# Patient Record
Sex: MALE | Race: WHITE | HISPANIC OR LATINO | Employment: UNEMPLOYED | URBAN - METROPOLITAN AREA
[De-identification: names, ages, dates, MRNs, and addresses within clinical notes are randomized per-mention and may not be internally consistent; named-entity substitution may affect disease eponyms.]

---

## 2020-06-01 ENCOUNTER — APPOINTMENT (OUTPATIENT)
Dept: RADIOLOGY | Facility: MEDICAL CENTER | Age: 75
End: 2020-06-01
Attending: EMERGENCY MEDICINE

## 2020-06-01 ENCOUNTER — HOSPITAL ENCOUNTER (EMERGENCY)
Facility: MEDICAL CENTER | Age: 75
End: 2020-06-01
Attending: EMERGENCY MEDICINE

## 2020-06-01 VITALS
HEIGHT: 66 IN | DIASTOLIC BLOOD PRESSURE: 74 MMHG | OXYGEN SATURATION: 96 % | SYSTOLIC BLOOD PRESSURE: 124 MMHG | TEMPERATURE: 98.1 F | BODY MASS INDEX: 25.16 KG/M2 | HEART RATE: 63 BPM | RESPIRATION RATE: 17 BRPM | WEIGHT: 156.53 LBS
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
## 2020-06-01 DIAGNOSIS — S09.90XA CLOSED HEAD INJURY, INITIAL ENCOUNTER: ICD-10-CM

## 2020-06-01 LAB
ALBUMIN SERPL BCP-MCNC: 4.1 G/DL (ref 3.2–4.9)
ALBUMIN/GLOB SERPL: 1.6 G/DL
ALP SERPL-CCNC: 102 U/L (ref 30–99)
ALT SERPL-CCNC: 20 U/L (ref 2–50)
ANION GAP SERPL CALC-SCNC: 13 MMOL/L (ref 7–16)
AST SERPL-CCNC: 22 U/L (ref 12–45)
BASOPHILS # BLD AUTO: 0.4 % (ref 0–1.8)
BASOPHILS # BLD: 0.03 K/UL (ref 0–0.12)
BILIRUB SERPL-MCNC: 0.9 MG/DL (ref 0.1–1.5)
BUN SERPL-MCNC: 17 MG/DL (ref 8–22)
CALCIUM SERPL-MCNC: 9 MG/DL (ref 8.5–10.5)
CHLORIDE SERPL-SCNC: 101 MMOL/L (ref 96–112)
CO2 SERPL-SCNC: 24 MMOL/L (ref 20–33)
CREAT SERPL-MCNC: 0.77 MG/DL (ref 0.5–1.4)
EKG IMPRESSION: NORMAL
EOSINOPHIL # BLD AUTO: 0.01 K/UL (ref 0–0.51)
EOSINOPHIL NFR BLD: 0.1 % (ref 0–6.9)
ERYTHROCYTE [DISTWIDTH] IN BLOOD BY AUTOMATED COUNT: 43.6 FL (ref 35.9–50)
GLOBULIN SER CALC-MCNC: 2.6 G/DL (ref 1.9–3.5)
GLUCOSE SERPL-MCNC: 125 MG/DL (ref 65–99)
HCT VFR BLD AUTO: 47.6 % (ref 42–52)
HGB BLD-MCNC: 16.3 G/DL (ref 14–18)
IMM GRANULOCYTES # BLD AUTO: 0.03 K/UL (ref 0–0.11)
IMM GRANULOCYTES NFR BLD AUTO: 0.4 % (ref 0–0.9)
LYMPHOCYTES # BLD AUTO: 0.8 K/UL (ref 1–4.8)
LYMPHOCYTES NFR BLD: 9.5 % (ref 22–41)
MCH RBC QN AUTO: 31.7 PG (ref 27–33)
MCHC RBC AUTO-ENTMCNC: 34.2 G/DL (ref 33.7–35.3)
MCV RBC AUTO: 92.4 FL (ref 81.4–97.8)
MONOCYTES # BLD AUTO: 0.46 K/UL (ref 0–0.85)
MONOCYTES NFR BLD AUTO: 5.5 % (ref 0–13.4)
NEUTROPHILS # BLD AUTO: 7.1 K/UL (ref 1.82–7.42)
NEUTROPHILS NFR BLD: 84.1 % (ref 44–72)
NRBC # BLD AUTO: 0 K/UL
NRBC BLD-RTO: 0 /100 WBC
PLATELET # BLD AUTO: 193 K/UL (ref 164–446)
PMV BLD AUTO: 11 FL (ref 9–12.9)
POTASSIUM SERPL-SCNC: 3.8 MMOL/L (ref 3.6–5.5)
PROT SERPL-MCNC: 6.7 G/DL (ref 6–8.2)
RBC # BLD AUTO: 5.15 M/UL (ref 4.7–6.1)
SODIUM SERPL-SCNC: 138 MMOL/L (ref 135–145)
TROPONIN T SERPL-MCNC: 11 NG/L (ref 6–19)
WBC # BLD AUTO: 8.4 K/UL (ref 4.8–10.8)

## 2020-06-01 PROCEDURE — 93005 ELECTROCARDIOGRAM TRACING: CPT

## 2020-06-01 PROCEDURE — 99285 EMERGENCY DEPT VISIT HI MDM: CPT

## 2020-06-01 PROCEDURE — 84484 ASSAY OF TROPONIN QUANT: CPT

## 2020-06-01 PROCEDURE — 80053 COMPREHEN METABOLIC PANEL: CPT

## 2020-06-01 PROCEDURE — 85025 COMPLETE CBC W/AUTO DIFF WBC: CPT

## 2020-06-01 PROCEDURE — 71045 X-RAY EXAM CHEST 1 VIEW: CPT

## 2020-06-01 PROCEDURE — 36415 COLL VENOUS BLD VENIPUNCTURE: CPT

## 2020-06-01 PROCEDURE — 93005 ELECTROCARDIOGRAM TRACING: CPT | Performed by: EMERGENCY MEDICINE

## 2020-06-01 PROCEDURE — 70450 CT HEAD/BRAIN W/O DYE: CPT

## 2020-06-01 SDOH — HEALTH STABILITY: MENTAL HEALTH: HOW OFTEN DO YOU HAVE A DRINK CONTAINING ALCOHOL?: NEVER

## 2020-06-02 NOTE — ED NOTES
Pt resting in bed. Call light in reach. Side rails up. Family at bedside. Blood drawn and sent to lab.

## 2020-06-02 NOTE — ED TRIAGE NOTES
Chief Complaint   Patient presents with   • GLF     unwitnessed, denies hitting his head, hx cva     Pt family found pt on the floor at home.  Pt hgas no recall of the event.  EKG ordered in triage

## 2020-06-02 NOTE — ED NOTES
Patient and family verbalize understanding of follow up, care, pain management and when to return to the ED.  Patient discharged with family.  All questions answered.   used.

## 2020-06-02 NOTE — ED PROVIDER NOTES
ED Provider Note    CHIEF COMPLAINT  Chief Complaint   Patient presents with   • GLF     unwitnessed, denies hitting his head, hx cva       HPI  Frederick Bui is a 74 y.o. male who presents for evaluation of possible fall.  Patient is accompanied by his daughter.  He is visiting from [place].  There is a report of a possible fall.  Patient denies this.  No report of pain or deformity to the upper or lower extremities.  No numbness weakness or tingling.  He apparently has had a prior stroke.  No other symptoms reported    REVIEW OF SYSTEMS  See HPI for further details.  No fever productive cough chest pain all other systems are negative.     PAST MEDICAL HISTORY  Past Medical History:   Diagnosis Date   • Hypertension    • Stroke (HCC)        FAMILY HISTORY  Noncontributory    SOCIAL HISTORY  Social History     Socioeconomic History   • Marital status: Not on file     Spouse name: Not on file   • Number of children: Not on file   • Years of education: Not on file   • Highest education level: Not on file   Occupational History   • Not on file   Social Needs   • Financial resource strain: Not on file   • Food insecurity     Worry: Not on file     Inability: Not on file   • Transportation needs     Medical: Not on file     Non-medical: Not on file   Tobacco Use   • Smoking status: Never Smoker   • Smokeless tobacco: Never Used   Substance and Sexual Activity   • Alcohol use: Never     Frequency: Never   • Drug use: Never   • Sexual activity: Not on file   Lifestyle   • Physical activity     Days per week: Not on file     Minutes per session: Not on file   • Stress: Not on file   Relationships   • Social connections     Talks on phone: Not on file     Gets together: Not on file     Attends Gnosticism service: Not on file     Active member of club or organization: Not on file     Attends meetings of clubs or organizations: Not on file     Relationship status: Not on file   • Intimate partner violence     Fear of  "current or ex partner: Not on file     Emotionally abused: Not on file     Physically abused: Not on file     Forced sexual activity: Not on file   Other Topics Concern   • Not on file   Social History Narrative   • Not on file     Visiting from [place]  SURGICAL HISTORY  No past surgical history on file.  Major surgeries  CURRENT MEDICATIONS  No current facility-administered medications for this encounter.   No current outpatient medications on file.      ALLERGIES  No Known Allergies    PHYSICAL EXAM  VITAL SIGNS: /80   Pulse 69   Temp 36.6 °C (97.9 °F) (Temporal)   Resp 14   Ht 1.676 m (5' 6\")   Wt 71 kg (156 lb 8.4 oz)   SpO2 98%   BMI 25.26 kg/m²       Constitutional: Well developed, Well nourished, No acute distress, Non-toxic appearance.   HENT: Normocephalic, Atraumatic, Bilateral external ears normal, Oropharynx moist, No oral exudates, Nose normal.   Eyes: PERRLA, EOMI, Conjunctiva normal, No discharge.   Neck: Normal range of motion, No tenderness, Supple, No stridor.   Cardiovascular: Normal heart rate, Normal rhythm, No murmurs, No rubs, No gallops.   Thorax & Lungs: Normal breath sounds, No respiratory distress, No wheezing, No chest tenderness.   Abdomen: Bowel sounds normal, Soft, No tenderness, No masses, No pulsatile masses.   Skin: Warm, Dry, No erythema, No rash.   Back: No tenderness, No CVA tenderness.   Extremities: Intact distal pulses, No edema, No tenderness, No cyanosis, No clubbing.   Musculoskeletal: Good range of motion in all major joints. No tenderness to palpation or major deformities noted.   Neurologic: Alert & oriented x 3, Normal motor function, Normal sensory function, No focal deficits noted.   Psychiatric: Affect normal, Judgment normal, Mood normal.     DX-CHEST-PORTABLE (1 VIEW)   Final Result      Bibasilar opacity likely from atelectasis      CT-HEAD W/O   Final Result         1. No acute intracranial hemorrhage.      2. Low-attenuation area in the left frontal " lobe could relate to encephalomalacia. Associated ex-vacuo dilatation of the left frontal horn.              Results for orders placed or performed during the hospital encounter of 06/01/20   CBC WITH DIFFERENTIAL   Result Value Ref Range    WBC 8.4 4.8 - 10.8 K/uL    RBC 5.15 4.70 - 6.10 M/uL    Hemoglobin 16.3 14.0 - 18.0 g/dL    Hematocrit 47.6 42.0 - 52.0 %    MCV 92.4 81.4 - 97.8 fL    MCH 31.7 27.0 - 33.0 pg    MCHC 34.2 33.7 - 35.3 g/dL    RDW 43.6 35.9 - 50.0 fL    Platelet Count 193 164 - 446 K/uL    MPV 11.0 9.0 - 12.9 fL    Neutrophils-Polys 84.10 (H) 44.00 - 72.00 %    Lymphocytes 9.50 (L) 22.00 - 41.00 %    Monocytes 5.50 0.00 - 13.40 %    Eosinophils 0.10 0.00 - 6.90 %    Basophils 0.40 0.00 - 1.80 %    Immature Granulocytes 0.40 0.00 - 0.90 %    Nucleated RBC 0.00 /100 WBC    Neutrophils (Absolute) 7.10 1.82 - 7.42 K/uL    Lymphs (Absolute) 0.80 (L) 1.00 - 4.80 K/uL    Monos (Absolute) 0.46 0.00 - 0.85 K/uL    Eos (Absolute) 0.01 0.00 - 0.51 K/uL    Baso (Absolute) 0.03 0.00 - 0.12 K/uL    Immature Granulocytes (abs) 0.03 0.00 - 0.11 K/uL    NRBC (Absolute) 0.00 K/uL   Comp Metabolic Panel   Result Value Ref Range    Sodium 138 135 - 145 mmol/L    Potassium 3.8 3.6 - 5.5 mmol/L    Chloride 101 96 - 112 mmol/L    Co2 24 20 - 33 mmol/L    Anion Gap 13.0 7.0 - 16.0    Glucose 125 (H) 65 - 99 mg/dL    Bun 17 8 - 22 mg/dL    Creatinine 0.77 0.50 - 1.40 mg/dL    Calcium 9.0 8.5 - 10.5 mg/dL    AST(SGOT) 22 12 - 45 U/L    ALT(SGPT) 20 2 - 50 U/L    Alkaline Phosphatase 102 (H) 30 - 99 U/L    Total Bilirubin 0.9 0.1 - 1.5 mg/dL    Albumin 4.1 3.2 - 4.9 g/dL    Total Protein 6.7 6.0 - 8.2 g/dL    Globulin 2.6 1.9 - 3.5 g/dL    A-G Ratio 1.6 g/dL   TROPONIN   Result Value Ref Range    Troponin T 11 6 - 19 ng/L   ESTIMATED GFR   Result Value Ref Range    GFR If African American >60 >60 mL/min/1.73 m 2    GFR If Non African American >60 >60 mL/min/1.73 m 2   EKG (NOW)   Result Value Ref Range    Report        [facility] Emergency Dept.    Test Date:  2020  Pt Name:    SATURNINO ROSAS      Department: ER  MRN:        [id number]                      Room:  Gender:     Male                         Technician: ROSEMARIE  :        1945                   Requested By:ER TRIAGE PROTOCOL  Order #:    [id number]                    Reading MD:    Measurements  Intervals                                Axis  Rate:       68                           P:          68  RI:         156                          QRS:        132  QRSD:       100                          T:          3  QT:         384  QTc:        409    Interpretive Statements  SINUS RHYTHM  RIGHT AXIS DEVIATION  INFERIOR INFARCT, AGE INDETERMINATE  No previous ECG available for comparison        COURSE & MEDICAL DECISION MAKING  Pertinent Labs & Imaging studies reviewed. (See chart for details)  Patient presents here with a possible fall.  His chest x-ray does not demonstrate any pneumothorax or rib fractures.  Laboratory studies including CBC metabolic panel troponin and CT scan of the head are unremarkable.  He was able to ambulate without any deficits.  I feel the patient can be safely discharged home with return precautions    FINAL IMPRESSION  1.  Ground-level fall  2.  Minor head injury         Electronically signed by: Arya Jones M.D., 2020 5:47 PM

## 2021-01-15 DIAGNOSIS — Z23 NEED FOR VACCINATION: ICD-10-CM
